# Patient Record
Sex: MALE | Race: BLACK OR AFRICAN AMERICAN | NOT HISPANIC OR LATINO | URBAN - METROPOLITAN AREA
[De-identification: names, ages, dates, MRNs, and addresses within clinical notes are randomized per-mention and may not be internally consistent; named-entity substitution may affect disease eponyms.]

---

## 2019-08-13 ENCOUNTER — EMERGENCY (EMERGENCY)
Facility: HOSPITAL | Age: 59
LOS: 0 days | Discharge: HOME | End: 2019-08-13
Attending: EMERGENCY MEDICINE | Admitting: EMERGENCY MEDICINE
Payer: MEDICAID

## 2019-08-13 VITALS
RESPIRATION RATE: 18 BRPM | HEART RATE: 66 BPM | OXYGEN SATURATION: 97 % | WEIGHT: 197.09 LBS | DIASTOLIC BLOOD PRESSURE: 82 MMHG | HEIGHT: 74 IN | TEMPERATURE: 97 F | SYSTOLIC BLOOD PRESSURE: 124 MMHG

## 2019-08-13 VITALS
HEART RATE: 68 BPM | TEMPERATURE: 97 F | OXYGEN SATURATION: 98 % | DIASTOLIC BLOOD PRESSURE: 87 MMHG | RESPIRATION RATE: 18 BRPM | SYSTOLIC BLOOD PRESSURE: 127 MMHG

## 2019-08-13 DIAGNOSIS — F10.10 ALCOHOL ABUSE, UNCOMPLICATED: ICD-10-CM

## 2019-08-13 DIAGNOSIS — F14.90 COCAINE USE, UNSPECIFIED, UNCOMPLICATED: ICD-10-CM

## 2019-08-13 PROCEDURE — 99282 EMERGENCY DEPT VISIT SF MDM: CPT

## 2019-08-13 NOTE — ED ADULT NURSE NOTE - NSIMPLEMENTINTERV_GEN_ALL_ED
Implemented All Universal Safety Interventions:  [place] to call system. Call bell, personal items and telephone within reach. Instruct patient to call for assistance. Room bathroom lighting operational. Non-slip footwear when patient is off stretcher. Physically safe environment: no spills, clutter or unnecessary equipment. Stretcher in lowest position, wheels locked, appropriate side rails in place.

## 2019-08-13 NOTE — ED PROVIDER NOTE - OBJECTIVE STATEMENT
58M no PMH, h/o 25 year crack cocaine and 25 year alcohol abuse p/w detox. Pt is undomiciled in [place], presented to a hospital in [place] for detox. Pt reports being transferred to [facility] in [place] and then transferred here. Denies CP, SOB, palpitations, SI/HI, self-harming behavior, hallucinations. Pt reports never being admitted to detox in the past.

## 2019-08-13 NOTE — ED PROVIDER NOTE - NS ED ROS FT
General: no fever, chills.   Cardiac:  No chest pain, SOB or edema. No chest pain with exertion.  Respiratory:  No cough or respiratory distress. No hemoptysis. No history of asthma or RAD.  GI:  No nausea, vomiting, diarrhea or abdominal pain.  Neuro:  No headache or weakness.  No LOC. Review of Systems:  	•	CONSTITUTIONAL: no fever, no diaphoresis, no chills  	•	SKIN: no rash  	•	HEMATOLOGIC: no bleeding, no bruising  	•	EYES: no eye pain, no blurry vision  	•	ENT: no change in hearing, no sore throat, no ear pain or tinnitus  	•	RESPIRATORY: no shortness of breath, no cough  	•	CARDIAC: no chest pain, no palpitations  	•	GI: no abd pain, no nausea, no vomiting, no diarrhea  	•	MUSCULOSKELETAL: no joint paint, no swelling, no redness  	•	NEUROLOGIC: no weakness, no headache, no paresthesias, no LOC  	•	PSYCH: no anxiety, non suicidal, non homicidal, no hallucination, no depression

## 2019-08-13 NOTE — ED PROVIDER NOTE - NSFOLLOWUPCLINICS_GEN_ALL_ED_FT
[facility]  Detox Mgmt  [address] [place] [address]  Phone: [phone]  Fax:   Follow Up Time: 1-3 Days

## 2019-08-13 NOTE — ED PROVIDER NOTE - NSFOLLOWUPINSTRUCTIONS_ED_ALL_ED_FT
Alcohol Use Disorder  Alcohol use disorder is when your drinking disrupts your daily life. When you have this condition, you drink too much alcohol and you cannot control your drinking.    Alcohol use disorder can cause serious problems with your physical health. It can affect your brain, heart, liver, pancreas, immune system, stomach, and intestines. Alcohol use disorder can increase your risk for certain cancers and cause problems with your mental health, such as depression, anxiety, psychosis, delirium, and dementia. People with this disorder risk hurting themselves and others.    What are the causes?  This condition is caused by drinking too much alcohol over time. It is not caused by drinking too much alcohol only one or two times. Some people with this condition drink alcohol to cope with or escape from negative life events. Others drink to relieve pain or symptoms of mental illness.    What increases the risk?  You are more likely to develop this condition if:    You have a family history of alcohol use disorder.  Your culture encourages drinking to the point of intoxication, or makes alcohol easy to get.  You had a mood or conduct disorder in childhood.  You have been a victim of abuse.  You are an adolescent and:    You have poor grades or difficulties in school.  Your caregivers do not talk to you about saying no to alcohol, or supervise your activities.  You are impulsive or you have trouble with self-control.      What are the signs or symptoms?  Symptoms of this condition include:    Drinking more than you want to.  Drinking for longer than you want to.  Trying several times to drink less or to control your drinking.  Spending a lot of time getting alcohol, drinking, or recovering from drinking.  Craving alcohol.  Having problems at work, at school, or at home due to drinking.  Having problems in relationships due to drinking.  Drinking when it is dangerous to drink, such as before driving a car.  Continuing to drink even though you know you might have a physical or mental problem related to drinking.  Needing more and more alcohol to get the same effect you want from the alcohol (building up tolerance).  Having symptoms of withdrawal when you stop drinking. Symptoms of withdrawal include:    Fatigue.  Nightmares.  Trouble sleeping.  Depression.  Anxiety.  Fever.  Seizures.  Severe confusion.  Feeling or seeing things that are not there (hallucinations).  Tremors.  Rapid heart rate.  Rapid breathing.  High blood pressure.    Drinking to avoid symptoms of withdrawal.    How is this diagnosed?  This condition is diagnosed with an assessment. Your health care provider may start the assessment by asking three or four questions about your drinking.    Your health care provider may perform a physical exam or do lab tests to see if you have physical problems resulting from alcohol use. She or he may refer you to a mental health professional for evaluation.    How is this treated?  Some people with alcohol use disorder are able to reduce their alcohol use to low-risk levels. Others need to completely quit drinking alcohol. When necessary, mental health professionals with specialized training in substance use treatment can help. Your health care provider can help you decide how severe your alcohol use disorder is and what type of treatment you need. The following forms of treatment are available:    Detoxification. Detoxification involves quitting drinking and using prescription medicines within the first week to help lessen withdrawal symptoms. This treatment is important for people who have had withdrawal symptoms before and for heavy drinkers who are likely to have withdrawal symptoms. Alcohol withdrawal can be dangerous, and in severe cases, it can cause death. Detoxification may be provided in a home, community, or primary care setting, or in a hospital or substance use treatment facility.  Counseling. This treatment is also called talk therapy. It is provided by substance use treatment counselors. A counselor can address the reasons you use alcohol and suggest ways to keep you from drinking again or to prevent problem drinking. The goals of talk therapy are to:    Find healthy activities and ways for you to cope with stress.  Identify and avoid the things that trigger your alcohol use.  Help you learn how to handle cravings.    Medicines. Medicines can help treat alcohol use disorder by:    Decreasing alcohol cravings.  Decreasing the positive feeling you have when you drink alcohol.  Causing an uncomfortable physical reaction when you drink alcohol (aversion therapy).    Support groups. Support groups are led by people who have quit drinking. They provide emotional support, advice, and guidance.    ImageThese forms of treatment are often combined. Some people with this condition benefit from a combination of treatments provided by specialized substance use treatment centers.    Follow these instructions at home:  Take over-the-counter and prescription medicines only as told by your health care provider.  Check with your health care provider before starting any new medicines.  Ask friends and family members not to offer you alcohol.  Avoid situations where alcohol is served, including gatherings where others are drinking alcohol.  Create a plan for what to do when you are tempted to use alcohol.  Find hobbies or activities that you enjoy that do not include alcohol.  Keep all follow-up visits as told by your health care provider. This is important.  How is this prevented?  If you drink, limit alcohol intake to no more than 1 drink a day for nonpregnant women and 2 drinks a day for men. One drink equals 12 oz of beer, 5 oz of wine, or 1½ oz of hard liquor.  If you have a mental health condition, get treatment and support.  Do not give alcohol to adolescents.  If you are an adolescent:    Do not drink alcohol.  Do not be afraid to say no if someone offers you alcohol. Speak up about why you do not want to drink. You can be a positive role model for your friends and set a good example for those around you by not drinking alcohol.  If your friends drink, spend time with others who do not drink alcohol. Make new friends who do not use alcohol.  Find healthy ways to manage stress and emotions, such as meditation or deep breathing, exercise, spending time in nature, listening to music, or talking with a trusted friend or family member.    Contact a health care provider if:  You are not able to take your medicines as told.  Your symptoms get worse.  You return to drinking alcohol (relapse) and your symptoms get worse.  Get help right away if:  You have thoughts about hurting yourself or others.  If you ever feel like you may hurt yourself or others, or have thoughts about taking your own life, get help right away. You can go to your nearest emergency department or call:     Your local emergency services (911 in the [place]   A suicide crisis helpline, such as the National Suicide Prevention Lifeline at 1-846.824.3758. This is open 24 hours a day.     Summary  Alcohol use disorder is when your drinking disrupts your daily life. When you have this condition, you drink too much alcohol and you cannot control your drinking.  Treatment may include detoxification, counseling, medicine, and support groups.  Ask friends and family members not to offer you alcohol. Avoid situations where alcohol is served.  Get help right away if you have thoughts about hurting yourself or others.  This information is not intended to replace advice given to you by your health care provider. Make sure you discuss any questions you have with your health care provider.

## 2019-08-13 NOTE — ED PROVIDER NOTE - CLINICAL SUMMARY MEDICAL DECISION MAKING FREE TEXT BOX
Pt clinically sober at present.  NL gait.  Pt offered outpt detox.  He wishes to f/u closer to home in [place].  Patient was discharged from the ED.  Verbal instructions were given, including instructions to return to ED immediately for any new, worsening, or concerning symptoms. Patient endorsed understanding. Written discharge instructions additionally given, including follow-up plan.

## 2019-08-13 NOTE — ED PROVIDER NOTE - PHYSICAL EXAMINATION
Constitutional: Well developed, well nourished. NAD. Good general hygiene  Head: Atraumatic.  Eyes: PERRLA. EOMI without discomfort.   ENT: No nasal discharge. Mucous membranes moist.  Neck: Supple. Painless ROM.  Cardiovascular: Regular rhythm. Regular rate. Normal S1 and S2. No murmurs. 2+ pulses in all extremities.   Pulmonary: Normal respiratory rate and effort. Lungs clear to auscultation bilaterally. No wheezing, rales, or rhonchi. Bilateral, equal lung expansion.   Abdominal: Soft. Nondistended. Nontender. No rebound or guarding.   Skin: No rashes.   Neuro: AAOx3. No focal neurological deficits.

## 2019-09-30 ENCOUNTER — HOSPITAL ENCOUNTER (INPATIENT)
Dept: HOSPITAL 74 - YASAS | Age: 59
LOS: 14 days | Discharge: HOME | DRG: 772 | End: 2019-10-14
Attending: NEUROMUSCULOSKELETAL MEDICINE & OMM | Admitting: NEUROMUSCULOSKELETAL MEDICINE & OMM
Payer: COMMERCIAL

## 2019-09-30 DIAGNOSIS — Z59.0: ICD-10-CM

## 2019-09-30 DIAGNOSIS — Z91.018: ICD-10-CM

## 2019-09-30 DIAGNOSIS — F14.20: ICD-10-CM

## 2019-09-30 DIAGNOSIS — H54.61: ICD-10-CM

## 2019-09-30 DIAGNOSIS — Z91.013: ICD-10-CM

## 2019-09-30 DIAGNOSIS — F10.20: Primary | ICD-10-CM

## 2019-09-30 DIAGNOSIS — F17.210: ICD-10-CM

## 2019-09-30 LAB
ALBUMIN SERPL-MCNC: 3.9 G/DL (ref 3.4–5)
ALP SERPL-CCNC: 41 U/L (ref 45–117)
ALT SERPL-CCNC: 37 U/L (ref 13–61)
ANION GAP SERPL CALC-SCNC: 6 MMOL/L (ref 8–16)
AST SERPL-CCNC: 30 U/L (ref 15–37)
BILIRUB SERPL-MCNC: 0.5 MG/DL (ref 0.2–1)
BUN SERPL-MCNC: 16.8 MG/DL (ref 7–18)
CALCIUM SERPL-MCNC: 9.1 MG/DL (ref 8.5–10.1)
CHLORIDE SERPL-SCNC: 106 MMOL/L (ref 98–107)
CO2 SERPL-SCNC: 28 MMOL/L (ref 21–32)
CREAT SERPL-MCNC: 1.3 MG/DL (ref 0.55–1.3)
DEPRECATED RDW RBC AUTO: 15.7 % (ref 11.9–15.9)
GLUCOSE SERPL-MCNC: 71 MG/DL (ref 74–106)
HCT VFR BLD CALC: 41.1 % (ref 35.4–49)
HGB BLD-MCNC: 13.4 GM/DL (ref 11.7–16.9)
MCH RBC QN AUTO: 30.1 PG (ref 25.7–33.7)
MCHC RBC AUTO-ENTMCNC: 32.6 G/DL (ref 32–35.9)
MCV RBC: 92.4 FL (ref 80–96)
PLATELET # BLD AUTO: 255 K/MM3 (ref 134–434)
PMV BLD: 7.8 FL (ref 7.5–11.1)
POTASSIUM SERPLBLD-SCNC: 4.2 MMOL/L (ref 3.5–5.1)
PROT SERPL-MCNC: 7.6 G/DL (ref 6.4–8.2)
RBC # BLD AUTO: 4.44 M/MM3 (ref 4–5.6)
SODIUM SERPL-SCNC: 139 MMOL/L (ref 136–145)
WBC # BLD AUTO: 5.4 K/MM3 (ref 4–10)

## 2019-09-30 PROCEDURE — HZ42ZZZ GROUP COUNSELING FOR SUBSTANCE ABUSE TREATMENT, COGNITIVE-BEHAVIORAL: ICD-10-PCS | Performed by: ALLERGY & IMMUNOLOGY

## 2019-09-30 RX ADMIN — Medication SCH: at 21:43

## 2019-09-30 SDOH — ECONOMIC STABILITY - HOUSING INSECURITY: HOMELESSNESS: Z59.0

## 2019-09-30 NOTE — HP
CIWA Score


Nausea/Vomitin-No Nausea/No Vomiting


Muscle Tremors: None


Anxiety: 1-Mildly Anxious


Agitation: 0-Normal Activity


Paroxysmal Sweats: No Perspiration


Orientation: 0-Oriented


Tacttile Disturbances: 0-None


Auditory Disturbances: 0-None


Visual Disturbances: 0-None


Headache: 0-None Present


CIWA-Ar Total Score: 1





- Admission Criteria


[facility] Guidelines: Admission for Medically Managed Detox: 


Requires at least one of the followin. CIWA greater than 12


2. Seizures within the past 24 hours


3. Delirium tremens within the past 24 hours


4. Hallucinations within the past 24 hours


5. Acute intervention needed for co  occurring medical disorder


6. Acute intervention needed for co  occurring psychiatric disorder


7. Severe withdrawal that cannot be handled at a lower level of care (continued


    vomiting, continued diarrhea, abnormal vital signs) requiring intravenous


    medication and/or fluids


8. Pregnancy





Patient presents the following: None of the above


Admission Criteria Met: Admission criteria not met





Admitting History and Physical





- Primary Care Physician


PCP: none





- Admission


Chief Complaint: Homer Pak is a 58- year old male presenting for 

inpatient rehab from alcohol and cocaine.


History Source: Patient


Limitations to Obtaining History: No Limitations





- Past Surgical History


Past Surgical History: Yes: None





- Smoking History


Smoking history: Current some day smoker


Have you smoked in the past 12 months: Yes





- Alcohol/Substance Use


Hx Alcohol Use: Yes


Number of Drinks Daily: 3 (occasional, not everyday)


History of Substance Use: reports: Cocaine


Date of Last Use: 19





- Social History


Usual Living Arrangement: Yes: Other (homeless)


Occupation: odd jobs


History of Recent Travel: No





Admission ROS [facility]





- [facility]


Chief Complaint: 





Homer Pak is a 58- year old male presenting for inpatient rehab from 

alcohol and cocaine.


Allergies/Adverse Reactions: 


 Allergies











Allergy/AdvReac Type Severity Reaction Status Date / Time


 


Fish Containing Products Allergy Intermediate Swelling Verified 19 11:18


 


red meat Allergy Intermediate Swelling Uncoded 19 11:18











History of Present Illness: 





Homer Pak is a 58- year old male presenting for inpatient rehab from 

alcohol and cocaine.


Alcohol: 3 beers a day, occasional user. 9 beers per week. Last drink 

yesterday. Occasional hard alcohol. Denies seizures, blackout, falls, head 

hits. Longest period of sobriety: 28 months while in inpatient rehab. Resumed 

drinking due to the desire to drink again. Denies withdrawal symptoms.


Crack: 100$ a time, 3 times a month. Denies withdrawal symptoms.





Has previously been to rehab, inpatient. Desires an outpatient rehab program.





Utox: + cocaine


Negative TONE





Medical History: denies


Psychiatric History: denies


Surgical History: denies


Meds: denies


Social: homeless. [place] on street. Does not want to live in shelter. Has "odd 

jobs", "little bit of this and that". No contact with family or friends.


Smoking: denies smoking daily. Occasional. 





Patient has attempted rehab on his own and has been unable to maintain sobriety 

and requires inpatient services. Currently homeless and without  

and will require services while inpatient.





Patient stated that has had PPD in the past 3-4 weeks and has documentation and 

will provide paperwork.





Exam Limitations: No Limitations





- Ebola screening


Have you traveled outside of the country in the last 21 days: No


Have you had contact with anyone from an Ebola affected area: No


Do you have a fever: No





- Review of Systems


Constitutional: No Symptoms Reported


EENT: reports: No Symptoms Reported


Respiratory: reports: No Symptoms reported


Cardiac: reports: No Symptoms Reported


GI: reports: No Symptoms Reported


: reports: No Symptoms Reported


Musculoskeletal: reports: No Symptoms Reported


Integumentary: reports: No Symptoms Reported


Neuro: reports: No Symptoms reported


Endocrine: reports: No Symptoms Reported


Hematology: reports: No Symptoms Reported


Psychiatric: reports: No Sypmtoms Reported, Judgement Intact, Mood/Affect 

Appropiate, Orientated x3





Patient History





- Patient Medical History


Hx Anemia: No


Hx Asthma: No


Hx Chronic Obstructive Pulmonary Disease (COPD): No


Hx Cancer: No


Hx Cardiac Disorders: No


Hx Congestive Heart Failure: No


Hx Hypertension: No


Hx Hypercholesterolemia: No


Hx Pacemaker: No


HX Cerebrovascular Accident: No


Hx Seizures: No


Hx Dementia: No


Hx Diabetes: No


Hx Gastrointestinal Disorders: No


Hx Liver Disease: No


Hx Genitourinary Disorders: No


Hx Sexually Transmitted Disorders: No


Hx Renal Disease (ESRD): No


Hx Thyroid Disease: No


Hx Human Immunodeficiency Virus (HIV): No


Hx Hepatitis C: No


Hx Depression: No


Hx Suicide Attempt: No


Hx Bipolar Disorder: No


Hx Schizophrenia: No


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





- Patient Surgical History


Past Surgical History: No


Hx Neurologic Surgery: No


Hx Cataract Extraction: No


Hx Cardiac Surgery: No


Hx Lung Surgery: No


Hx Breast Surgery: No


Hx Breast Biopsy: No


Hx Abdominal Surgery: No


Hx Appendectomy: No


Hx Cholecystectomy: No


Hx Genitourinary Surgery: No


Hx  Section: No


Hx Orthopedic Surgery: No


Hx Hysterectomy: No





- PPD History


Previous Implant?: No


Documented Results: Negative w/o proof


Date: 19 (patient states had PPD implanted previously and will provide 

paperwork.)


PPD to be Administered?: No





- Smoking Cessation


Smoking history: Current some day smoker


Have you smoked in the past 12 months: Yes


Initiated information on smoking cessation: Yes


'Breaking Loose' booklet given: 19





- Substance & Tx. History


Hx Alcohol Use: Yes


Hx Substance Use: Yes


Substance Use Type: Alcohol, Cocaine





- Substances abused


  ** Crack


Substance route: Smoking


Frequency: 3-6 times per week


Amount used: $50


Age of first use: 30


Date of last use: 19





  ** Alcohol


Other (specify): beer


Substance route: Oral


Frequency: 3-6 times per week


Amount used: 3 22 oz beer


Age of first use: 15


Date of last use: 19





Admission Physical Exam [facility]





- Vital Signs


Vital Signs: 


 Vital Signs - 24 hr











  19





  11:09


 


Temperature 97 F L


 


Pulse Rate 63


 


Respiratory 18





Rate 


 


Blood Pressure 132/88














- Physical


General Appearance: Yes: No Apparent Distress, Appropriately Dressed


HEENTM: Yes: Hearing grossly Normal, Normocephalic, Normal Voice, Pharynx Normal

, Orbits (Sunken inferior portion of R orbit,)


Respiratory: Yes: Normal Breath Sounds, No Respiratory Distress, No Accessory 

Muscle Use


Neck: Yes: No masses,lesions,Nodules, Trachea in good position


Breast: Yes: Breast Exam Deferred


Cardiology: Yes: Regular Rhythm, Regular Rate, S1, S2


Abdominal: Yes: Normal Bowel Sounds, Non Tender, Flat, Soft


Back: Yes: Normal Inspection


Musculoskeletal: Yes: full range of Motion, Gait Steady, Pelvis Stable


Extremities: Yes: Normal Capillary Refill, Normal Inspection, Normal Range of 

Motion, Non-Tender


Neurological: Yes: CNs II-XII NML intact, Fully Oriented, Alert, Motor Strength 

5/5, Normal Mood/Affect


Integumentary: Yes: Normal Color, Dry, Warm





- Diagnostic


(1) Alcohol abuse


Current Visit: Yes   Status: Acute   





(2) Cocaine abuse


Current Visit: Yes   Status: Acute   





(3) Nicotine abuse


Current Visit: Yes   Status: Acute   





Cleared for Admission [facility]





- Detox or Rehab


Claeared for Rehab Admission: No





Screened but not Admitted





- Documentation of Visit


Screened but not Admitted: No


Left Prior to Completion of Assessment: No


Insurance Authorization Denied: No


Patient Does Not Meet Criteria for Admission: No


Alternative Treatment/Shelter Info Provided: No


Additional Information/Explanation: Requiring inpatient rehab and social 

services. Currently homless and without social supports in place.





Breathalyzer





- Breathalyzer


Breathalyzer: 0





Urine Drug Screen





- Test Device


Lot number: [id number]


Expiration date: 21





- Control


Is test valid?: Yes





- Results


Drug screen NEGATIVE: No


Urine drug screen results: ROSALIND-Cocaine





Inpatient Rehab Admission





- Rehab Decision to Admit


Inpatient rehab admission?: Yes





- Initial Determination


Are CD services needed?: Yes


Free of communicable disease: Yes


Not in need of hospitalization: Yes





- Rehab Admission Criteria


Previous failed treatment: Yes


Poor recovery environment: Yes


Comorbidities: Yes


Lacks judgement: Yes


Patient is meeting Inpatient Rehab admission criteria:: Yes (requiring 

inpatient  and rehab)

## 2019-09-30 NOTE — EKG
Test Reason : 

Blood Pressure : ***/*** mmHG

Vent. Rate : 046 BPM     Atrial Rate : 046 BPM

   P-R Int : 176 ms          QRS Dur : 102 ms

    QT Int : 480 ms       P-R-T Axes : -09 061 011 degrees

   QTc Int : 420 ms

 

SINUS BRADYCARDIA

MINIMAL VOLTAGE CRITERIA FOR LVH, MAY BE NORMAL VARIANT

BORDERLINE ECG

NO PREVIOUS ECGS AVAILABLE

Confirmed by MARCIAL VARGAS, JAMIE ([id number]) on 9/30/2019 2:23:25 PM

 

Referred By:             Confirmed By:JAMIE CEJA MD

## 2019-09-30 NOTE — PN
Teaching Attending Note


Name of Resident: Giovani Gasca





ATTENDING PHYSICIAN STATEMENT





I saw and evaluated the patient.


I reviewed the resident's note and discussed the case with the resident.


I agree with the resident's findings and plan as documented.








SUBJECTIVE:


Agree with subjective findings from resident





OBJECTIVE:





Agree with objective finding from resident


ASSESSMENT AND PLAN:


Admit patient to rehab for cocaine/ crack and alcohol.


Dr. Yusuf

## 2019-10-01 LAB
APPEARANCE UR: CLEAR
BACTERIA # UR AUTO: 1.9 /HPF
BILIRUB UR STRIP.AUTO-MCNC: NEGATIVE MG/DL
CASTS URNS QL MICRO: 9 /LPF (ref 0–8)
COLOR UR: YELLOW
EPITH CASTS URNS QL MICRO: 2 /HPF
KETONES UR QL STRIP: NEGATIVE
LEUKOCYTE ESTERASE UR QL STRIP.AUTO: NEGATIVE
NITRITE UR QL STRIP: NEGATIVE
PH UR: 6 [PH] (ref 5–8)
PROT UR QL STRIP: NEGATIVE
PROT UR QL STRIP: NEGATIVE
RBC # BLD AUTO: 3 /HPF (ref 0–4)
SP GR UR: 1.02 (ref 1.01–1.03)
UROBILINOGEN UR STRIP-MCNC: 0.2 MG/DL (ref 0.2–1)
WBC # UR AUTO: 2 /HPF (ref 0–5)

## 2019-10-01 RX ADMIN — Medication SCH TAB: at 10:02

## 2019-10-01 RX ADMIN — Medication SCH: at 21:43

## 2019-10-02 RX ADMIN — Medication SCH TAB: at 09:47

## 2019-10-02 RX ADMIN — Medication SCH: at 21:35

## 2019-10-03 RX ADMIN — Medication SCH: at 10:47

## 2019-10-03 RX ADMIN — Medication SCH MG: at 22:08

## 2019-10-03 NOTE — PN
[facility] Progress Note


Note: 





New pos PPD- cxray.


Pt educated about this finding.


Pt without Sx of cough, fever, chest pain,..

## 2019-10-04 RX ADMIN — Medication SCH: at 21:45

## 2019-10-04 RX ADMIN — Medication SCH: at 10:43

## 2019-10-05 RX ADMIN — Medication SCH: at 21:28

## 2019-10-05 RX ADMIN — Medication SCH: at 09:29

## 2019-10-06 RX ADMIN — Medication SCH: at 21:31

## 2019-10-06 RX ADMIN — Medication SCH: at 09:32

## 2019-10-07 RX ADMIN — Medication SCH: at 10:28

## 2019-10-07 RX ADMIN — Medication SCH: at 22:04

## 2019-10-08 RX ADMIN — Medication SCH: at 21:37

## 2019-10-08 RX ADMIN — Medication SCH: at 10:15

## 2019-10-09 RX ADMIN — Medication SCH: at 21:32

## 2019-10-09 RX ADMIN — Medication SCH: at 10:03

## 2019-10-10 RX ADMIN — Medication SCH: at 21:57

## 2019-10-10 RX ADMIN — Medication SCH: at 10:08

## 2019-10-11 RX ADMIN — Medication SCH: at 21:38

## 2019-10-11 RX ADMIN — Medication SCH: at 10:43

## 2019-10-12 RX ADMIN — Medication SCH: at 10:27

## 2019-10-12 RX ADMIN — Medication SCH: at 21:49

## 2019-10-13 VITALS — HEART RATE: 62 BPM | TEMPERATURE: 97.5 F

## 2019-10-13 RX ADMIN — Medication SCH: at 21:40

## 2019-10-13 RX ADMIN — Medication SCH: at 09:42

## 2019-10-14 VITALS — SYSTOLIC BLOOD PRESSURE: 140 MMHG | DIASTOLIC BLOOD PRESSURE: 91 MMHG

## 2019-10-14 NOTE — DS
[facility] Rehab Discharge Summary





- [facility] Rehab Discharge Summary


Admission Date: 09/30/19


Discharge Date: 10/14/19





- History


Present History: Alcohol dependence, Cocaine dependence





- Discharge Physical Exam


Vital Signs: 


 Vital Signs











Temperature  97.5 F L  10/14/19 06:00


 


Pulse Rate  62   10/14/19 06:00


 


Respiratory Rate  18   10/14/19 06:00


 


Blood Pressure  140/91   10/14/19 06:00


 


O2 Sat by Pulse Oximetry (%)      











Pertinent Admission Physical Exam Findings: 





ROS: denies etoh/cocaine cravings, chest pain, sob and dizziness. 





PE:





alert and oriented x 3


skin warm and dry


+perrla, eoms intact bl


neck supple, no jvd


car s1s2, rrr, no murmurs or gallops


resp cta bl no wheezes or rales


ext no tremors, amb ad cecilio, no visible edema











- Treatment


Discharge Condition: Discharge condition good


Hospital Course: 





Patient completed rehab today. Patient attended group meetings and reports 

accomplishing all rehab goals.  Aftercare arranged for AA/NA and patient 

encouraged to complete 12 step program and attend group meetings to prevent 

relapse. Patient is medically stable and denies SI/HI at time of discharge. 





 Vital Signs











Temperature  97.5 F L  10/14/19 06:00


 


Pulse Rate  62   10/14/19 06:00


 


Respiratory Rate  18   10/14/19 06:00


 


Blood Pressure  140/91   10/14/19 06:00


 


O2 Sat by Pulse Oximetry (%)      








 Laboratory Tests











  09/30/19 09/30/19 09/30/19





  13:10 13:10 13:10


 


WBC  5.4  


 


RBC  4.44  


 


Hgb  13.4  


 


Hct  41.1  


 


MCV  92.4  


 


MCH  30.1  


 


MCHC  32.6  


 


RDW  15.7  


 


Plt Count  255  


 


MPV  7.8  


 


Sodium   139 


 


Potassium   4.2 


 


Chloride   106 


 


Carbon Dioxide   28 


 


Anion Gap   6 L 


 


BUN   16.8 


 


Creatinine   1.3 


 


Est GFR (CKD-EPI)AfAm   68.74 


 


Est GFR (CKD-EPI)NonAf   59.31 


 


Random Glucose   71 L 


 


Calcium   9.1 


 


Total Bilirubin   0.5 


 


AST   30 


 


ALT   37 


 


Alkaline Phosphatase   41 L 


 


Total Protein   7.6 


 


Albumin   3.9 


 


Urine Color   


 


Urine Appearance   


 


Urine pH   


 


Ur Specific Gravity   


 


Urine Protein   


 


Urine Glucose (UA)   


 


Urine Ketones   


 


Urine Blood   


 


Urine Nitrite   


 


Urine Bilirubin   


 


Urine Urobilinogen   


 


Ur Leukocyte Esterase   


 


Urine WBC (Auto)   


 


Urine RBC (Auto)   


 


Urine Casts (Auto)   


 


U Epithel Cells (Auto)   


 


Urine Bacteria (Auto)   


 


RPR Titer    Nonreactive














  10/01/19





  09:40


 


WBC 


 


RBC 


 


Hgb 


 


Hct 


 


MCV 


 


MCH 


 


MCHC 


 


RDW 


 


Plt Count 


 


MPV 


 


Sodium 


 


Potassium 


 


Chloride 


 


Carbon Dioxide 


 


Anion Gap 


 


BUN 


 


Creatinine 


 


Est GFR (CKD-EPI)AfAm 


 


Est GFR (CKD-EPI)NonAf 


 


Random Glucose 


 


Calcium 


 


Total Bilirubin 


 


AST 


 


ALT 


 


Alkaline Phosphatase 


 


Total Protein 


 


Albumin 


 


Urine Color  Yellow


 


Urine Appearance  Clear


 


Urine pH  6.0


 


Ur Specific Gravity  1.021


 


Urine Protein  Negative


 


Urine Glucose (UA)  Negative


 


Urine Ketones  Negative


 


Urine Blood  Trace


 


Urine Nitrite  Negative


 


Urine Bilirubin  Negative


 


Urine Urobilinogen  0.2


 


Ur Leukocyte Esterase  Negative


 


Urine WBC (Auto)  2


 


Urine RBC (Auto)  3


 


Urine Casts (Auto)  9


 


U Epithel Cells (Auto)  2.0


 


Urine Bacteria (Auto)  1.9


 


RPR Titer 








Ambulatory Orders





NK [No Known Home Medication]  09/30/19 











- Medication


Discharge Medications: 


Ambulatory Orders





NK [No Known Home Medication]  09/30/19 











- Medication-Assisted Treatment (MAT)


Medication-Assisted Treatment (MAT): No





- Discharge Instructions


Diet, activity, other medical instructions: 





Diet: reg as tolerated





Activity: regular





Other medical instructions:


follow up with pcp within one week of discharge





- Follow-up Referral


Minutes to complete discharge: 30





- AMA


Did Patient Leave Against Medical Advice: No

## 2019-11-13 ENCOUNTER — EMERGENCY (EMERGENCY)
Facility: HOSPITAL | Age: 59
LOS: 1 days | Discharge: HOME | End: 2019-11-13
Attending: EMERGENCY MEDICINE | Admitting: EMERGENCY MEDICINE
Payer: MEDICAID

## 2019-11-13 VITALS
DIASTOLIC BLOOD PRESSURE: 105 MMHG | HEART RATE: 85 BPM | HEIGHT: 74 IN | SYSTOLIC BLOOD PRESSURE: 148 MMHG | TEMPERATURE: 98 F | WEIGHT: 210.1 LBS | OXYGEN SATURATION: 97 % | RESPIRATION RATE: 18 BRPM

## 2019-11-13 DIAGNOSIS — R51 HEADACHE: ICD-10-CM

## 2019-11-13 LAB
ALBUMIN SERPL ELPH-MCNC: 3.9 G/DL — SIGNIFICANT CHANGE UP (ref 3.4–5)
ALP SERPL-CCNC: 38 U/L — LOW (ref 40–120)
ALT FLD-CCNC: 22 U/L — SIGNIFICANT CHANGE UP (ref 12–42)
ANION GAP SERPL CALC-SCNC: 11 MMOL/L — SIGNIFICANT CHANGE UP (ref 9–16)
AST SERPL-CCNC: 31 U/L — SIGNIFICANT CHANGE UP (ref 15–37)
BASOPHILS # BLD AUTO: 0.05 K/UL — SIGNIFICANT CHANGE UP (ref 0–0.2)
BASOPHILS NFR BLD AUTO: 0.5 % — SIGNIFICANT CHANGE UP (ref 0–2)
BILIRUB SERPL-MCNC: 0.8 MG/DL — SIGNIFICANT CHANGE UP (ref 0.2–1.2)
BUN SERPL-MCNC: 12 MG/DL — SIGNIFICANT CHANGE UP (ref 7–23)
CALCIUM SERPL-MCNC: 9.5 MG/DL — SIGNIFICANT CHANGE UP (ref 8.5–10.5)
CHLORIDE SERPL-SCNC: 103 MMOL/L — SIGNIFICANT CHANGE UP (ref 96–108)
CO2 SERPL-SCNC: 26 MMOL/L — SIGNIFICANT CHANGE UP (ref 22–31)
CREAT SERPL-MCNC: 1.04 MG/DL — SIGNIFICANT CHANGE UP (ref 0.5–1.3)
EOSINOPHIL # BLD AUTO: 0.01 K/UL — SIGNIFICANT CHANGE UP (ref 0–0.5)
EOSINOPHIL NFR BLD AUTO: 0.1 % — SIGNIFICANT CHANGE UP (ref 0–6)
GLUCOSE SERPL-MCNC: 92 MG/DL — SIGNIFICANT CHANGE UP (ref 70–99)
HCT VFR BLD CALC: 37.1 % — LOW (ref 39–50)
HGB BLD-MCNC: 13 G/DL — SIGNIFICANT CHANGE UP (ref 13–17)
IMM GRANULOCYTES NFR BLD AUTO: 0.3 % — SIGNIFICANT CHANGE UP (ref 0–1.5)
LYMPHOCYTES # BLD AUTO: 1.85 K/UL — SIGNIFICANT CHANGE UP (ref 1–3.3)
LYMPHOCYTES # BLD AUTO: 16.8 % — SIGNIFICANT CHANGE UP (ref 13–44)
MCHC RBC-ENTMCNC: 30.5 PG — SIGNIFICANT CHANGE UP (ref 27–34)
MCHC RBC-ENTMCNC: 35 GM/DL — SIGNIFICANT CHANGE UP (ref 32–36)
MCV RBC AUTO: 87.1 FL — SIGNIFICANT CHANGE UP (ref 80–100)
MONOCYTES # BLD AUTO: 0.87 K/UL — SIGNIFICANT CHANGE UP (ref 0–0.9)
MONOCYTES NFR BLD AUTO: 7.9 % — SIGNIFICANT CHANGE UP (ref 2–14)
NEUTROPHILS # BLD AUTO: 8.17 K/UL — HIGH (ref 1.8–7.4)
NEUTROPHILS NFR BLD AUTO: 74.4 % — SIGNIFICANT CHANGE UP (ref 43–77)
NRBC # BLD: 0 /100 WBCS — SIGNIFICANT CHANGE UP (ref 0–0)
PLATELET # BLD AUTO: 234 K/UL — SIGNIFICANT CHANGE UP (ref 150–400)
POTASSIUM SERPL-MCNC: 4.3 MMOL/L — SIGNIFICANT CHANGE UP (ref 3.5–5.3)
POTASSIUM SERPL-SCNC: 4.3 MMOL/L — SIGNIFICANT CHANGE UP (ref 3.5–5.3)
PROT SERPL-MCNC: 8.1 G/DL — SIGNIFICANT CHANGE UP (ref 6.4–8.2)
RBC # BLD: 4.26 M/UL — SIGNIFICANT CHANGE UP (ref 4.2–5.8)
RBC # FLD: 15.1 % — HIGH (ref 10.3–14.5)
SODIUM SERPL-SCNC: 140 MMOL/L — SIGNIFICANT CHANGE UP (ref 132–145)
WBC # BLD: 10.98 K/UL — HIGH (ref 3.8–10.5)
WBC # FLD AUTO: 10.98 K/UL — HIGH (ref 3.8–10.5)

## 2019-11-13 PROCEDURE — 70498 CT ANGIOGRAPHY NECK: CPT | Mod: 26

## 2019-11-13 PROCEDURE — 71046 X-RAY EXAM CHEST 2 VIEWS: CPT | Mod: 26

## 2019-11-13 PROCEDURE — 70496 CT ANGIOGRAPHY HEAD: CPT | Mod: 26

## 2019-11-13 PROCEDURE — 99053 MED SERV 10PM-8AM 24 HR FAC: CPT

## 2019-11-13 PROCEDURE — 99284 EMERGENCY DEPT VISIT MOD MDM: CPT

## 2019-11-13 PROCEDURE — 70450 CT HEAD/BRAIN W/O DYE: CPT | Mod: 26,59

## 2019-11-13 RX ORDER — ACETAMINOPHEN 500 MG
2 TABLET ORAL
Qty: 56 | Refills: 0
Start: 2019-11-13 | End: 2019-11-19

## 2019-11-13 RX ORDER — METHOCARBAMOL 500 MG/1
1500 TABLET, FILM COATED ORAL ONCE
Refills: 0 | Status: COMPLETED | OUTPATIENT
Start: 2019-11-13 | End: 2019-11-13

## 2019-11-13 RX ORDER — LIDOCAINE 4 G/100G
1 CREAM TOPICAL ONCE
Refills: 0 | Status: COMPLETED | OUTPATIENT
Start: 2019-11-13 | End: 2019-11-13

## 2019-11-13 RX ORDER — ACETAMINOPHEN 500 MG
975 TABLET ORAL ONCE
Refills: 0 | Status: COMPLETED | OUTPATIENT
Start: 2019-11-13 | End: 2019-11-13

## 2019-11-13 RX ORDER — LIDOCAINE 4 G/100G
1 CREAM TOPICAL
Qty: 1 | Refills: 0
Start: 2019-11-13 | End: 2019-11-22

## 2019-11-13 RX ORDER — METHOCARBAMOL 500 MG/1
2 TABLET, FILM COATED ORAL
Qty: 18 | Refills: 0
Start: 2019-11-13 | End: 2019-11-15

## 2019-11-13 RX ADMIN — METHOCARBAMOL 1500 MILLIGRAM(S): 500 TABLET, FILM COATED ORAL at 05:57

## 2019-11-13 RX ADMIN — Medication 975 MILLIGRAM(S): at 05:57

## 2019-11-13 RX ADMIN — LIDOCAINE 1 PATCH: 4 CREAM TOPICAL at 05:57

## 2019-11-13 NOTE — ED PROVIDER NOTE - PHYSICAL EXAMINATION
Physical Exam  GEN: Awake, alert, non-toxic appearing, NCAT  EYES: full EOMI,  ENT: External inspection normal, normal voice,   HEAD: atraumatic  NECK: supple, no meningismus, pain w/ neck rotation (more on the left side) but able to complete full ROM, pain w/ lateral flexion (more on L), no tenderness, no crepitus  CV: rrr,   RESP: cta bl, no tachypnea, no hypoxia, no resp distress,  GI: +BS, Soft, nontender, no guarding/rebound,   MSK: FROM all 4 extremities,   SKIN: Color normal for race, warm and dry, no rash  NEURO: perrl, full EOMI, f-n normal, neg pronator, neg romberg, strength 5/5, normal gait, no dysmetria, no dysdiadochokinesia

## 2019-11-13 NOTE — ED PROVIDER NOTE - CLINICAL SUMMARY MEDICAL DECISION MAKING FREE TEXT BOX
neck pain rad to head, nontender, reduced ROM w/ lateral flexion but able to flex/extend and rotate fully, pain w/ ROM, no erythema, no lymphadenopathy on exam, ao x 4, nonfocal neuro exam, no cp/back pain/abd pain, will check labs, xray, CT, analgesia prn

## 2019-11-13 NOTE — ED ADULT NURSE NOTE - CAS DISCH TRANSFER METHOD
Explained to pt that he will see Dr. Solis in April for his continuing care and Dr. Landeros's nurse will call him after she verifies with Dr. Landeros what further care he may need from them.  Pt has the numbers and addresses of each of those departments.  V/U.    Walking

## 2019-11-13 NOTE — ED PROVIDER NOTE - PATIENT PORTAL LINK FT
You can access the FollowMyHealth Patient Portal offered by [facility] by registering at the following website: [email or web address]. By joining [employer]’s FollowMyHealth portal, you will also be able to view your health information using other applications (apps) compatible with our system.

## 2019-11-13 NOTE — ED ADULT NURSE NOTE - CHPI ED NUR SYMPTOMS NEG
no blurred vision/no nausea/no change in level of consciousness/no vomiting/no fever/no weakness/no loss of consciousness/no dizziness/no confusion

## 2019-11-13 NOTE — ED PROVIDER NOTE - PROGRESS NOTE DETAILS
ct reviewed and d/w pt.  also d/w the potential dx of SAH.  I informed pt that LP is the gold diagnostic study to r/o SAH.  I also explained that CT may miss SAH.  also informed the risks of not diagnosing SAH which including stroke, permanent disability, and/or death.  pt ao x 3, appears to have capacity, understands discussion, declines LP.  strict return precautions given.

## 2019-11-13 NOTE — ED PROVIDER NOTE - OBJECTIVE STATEMENT
58 yom pw lorenzana, occurred while he was walking on the street today.  no vertigo, no lh, no loc, no paresthesia or weakness.  no change in vision.  no hx of ha.  ha from back of neck, left side, rad up to scalp.  ha worse w/ coughing.  hx of "borderline" htn, not on meds.  denies cp.  pt endorsed to EMS that he was sob when the HA began.  recently quit smoking but re-started this morning.

## 2019-11-13 NOTE — ED PROVIDER NOTE - NSFOLLOWUPINSTRUCTIONS_ED_ALL_ED_FT
Follow up with your primary care doctor or clinics listed below if you do not have a doctor  [address] [address]  To make an appointment, call [phone]  [facility] has a pharmacy that can help you with affordable insurance  [facility]  Address: [address] [place] [id number]  Appointment Center: [phone] ([phone])   Take tylenol for pain as needed (975mg every 6 hours)  Take robaxin for spasm  Robaxin can cause drowsiness  Return immediately for any new or worsening symptoms or any new concerns
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
## 2019-11-14 PROBLEM — Z78.9 OTHER SPECIFIED HEALTH STATUS: Chronic | Status: ACTIVE | Noted: 2019-08-13
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
## 2023-08-15 NOTE — ED ADULT NURSE NOTE - NO SIGNIFICANT PAST SURGICAL HISTORY
FAMILY HISTORY:  Father  Still living? Unknown  FHx: diabetes mellitus, Age at diagnosis: Age Unknown    Sibling  Still living? Unknown  FH: ovarian cancer, Age at diagnosis: Age Unknown    
<<----- Click to add NO significant Past Surgical History
